# Patient Record
Sex: FEMALE | Race: WHITE | HISPANIC OR LATINO | URBAN - METROPOLITAN AREA
[De-identification: names, ages, dates, MRNs, and addresses within clinical notes are randomized per-mention and may not be internally consistent; named-entity substitution may affect disease eponyms.]

---

## 2017-06-02 ENCOUNTER — EMERGENCY (EMERGENCY)
Facility: HOSPITAL | Age: 27
LOS: 1 days | Discharge: HOME | End: 2017-06-02
Attending: EMERGENCY MEDICINE
Payer: COMMERCIAL

## 2017-06-02 VITALS
HEIGHT: 63 IN | HEART RATE: 91 BPM | TEMPERATURE: 98 F | OXYGEN SATURATION: 97 % | SYSTOLIC BLOOD PRESSURE: 149 MMHG | WEIGHT: 160.06 LBS | RESPIRATION RATE: 18 BRPM | DIASTOLIC BLOOD PRESSURE: 99 MMHG

## 2017-06-02 DIAGNOSIS — S61.210A LACERATION WITHOUT FOREIGN BODY OF RIGHT INDEX FINGER WITHOUT DAMAGE TO NAIL, INITIAL ENCOUNTER: ICD-10-CM

## 2017-06-02 DIAGNOSIS — Z88.6 ALLERGY STATUS TO ANALGESIC AGENT: ICD-10-CM

## 2017-06-02 DIAGNOSIS — K21.9 GASTRO-ESOPHAGEAL REFLUX DISEASE WITHOUT ESOPHAGITIS: ICD-10-CM
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
## 2017-06-02 PROCEDURE — 99283 EMERGENCY DEPT VISIT LOW MDM: CPT | Mod: 25

## 2017-06-02 PROCEDURE — 73140 X-RAY EXAM OF FINGER(S): CPT | Mod: 26,RT

## 2017-06-02 PROCEDURE — 12001 RPR S/N/AX/GEN/TRNK 2.5CM/<: CPT

## 2017-06-02 PROCEDURE — 73140 X-RAY EXAM OF FINGER(S): CPT

## 2017-06-02 NOTE — ED STATDOCS - PROGRESS NOTE DETAILS
NP NOTE: Pt seen by intake physician and HPI/ROS/PE/MDM reviewed. Pt seen and evaluated. Discussed plan and any resulted studies at this time. Will continue to monitor and re-evaluate.  Re-Evaluation: wound sutured, instructions given, fu with hand

## 2017-06-02 NOTE — ED STATDOCS - OBJECTIVE STATEMENT
25 y/o F pt w/ no significant PMHx presents to the ED c/o R 2nd finger lac onset today. Pt states that she was using a tomato slicer at work when she sustained the lac. Pt denies numbness/tingling, focal weakness, other injuries, or any other complaints. NKDA. Pt's tetanus is UTD.

## 2017-06-02 NOTE — ED STATDOCS - ATTENDING CONTRIBUTION TO CARE
I, Travis Saenz, performed the initial face to face bedside interview with this patient regarding history of present illness, review of symptoms and relevant past medical, social and family history.  I completed an independent physical examination.  I was the initial provider who evaluated this patient. I have signed out the follow up of any pending tests (i.e. labs, radiological studies) to the ACP.  I have communicated the patient’s plan of care and disposition with the ACP.  The history, relevant review of systems, past medical and surgical history, medical decision making, and physical examination was documented by the scribe in my presence and I attest to the accuracy of the documentation.

## 2017-06-02 NOTE — ED PROCEDURE NOTE - CPROC ED POST PROC CARE GUIDE1
Elevate the injured extremity as instructed./Instructed patient/caregiver regarding signs and symptoms of infection./Verbal/written post procedure instructions were given to patient/caregiver./Keep the cast/splint/dressing clean and dry./Instructed patient/caregiver to follow-up with primary care physician.
Verbal/written post procedure instructions were given to patient/caregiver./Keep the cast/splint/dressing clean and dry./Instructed patient/caregiver to follow-up with primary care physician./Instructed patient/caregiver regarding signs and symptoms of infection.

## 2017-06-02 NOTE — ED STATDOCS - NS ED MD SCRIBE ATTENDING SCRIBE SECTIONS
VITAL SIGNS( Pullset)/PHYSICAL EXAM/DISPOSITION/REVIEW OF SYSTEMS/HISTORY OF PRESENT ILLNESS/PAST MEDICAL/SURGICAL/SOCIAL HISTORY/HIV

## 2017-10-04 ENCOUNTER — TRANSCRIPTION ENCOUNTER (OUTPATIENT)
Age: 27
End: 2017-10-04

## 2018-06-26 ENCOUNTER — TRANSCRIPTION ENCOUNTER (OUTPATIENT)
Age: 28
End: 2018-06-26

## 2019-10-08 ENCOUNTER — EMERGENCY (EMERGENCY)
Facility: HOSPITAL | Age: 29
LOS: 1 days | Discharge: HOME | End: 2019-10-08
Attending: EMERGENCY MEDICINE
Payer: COMMERCIAL

## 2019-10-08 VITALS
TEMPERATURE: 99 F | HEIGHT: 63 IN | SYSTOLIC BLOOD PRESSURE: 124 MMHG | DIASTOLIC BLOOD PRESSURE: 76 MMHG | WEIGHT: 156.97 LBS | OXYGEN SATURATION: 97 % | RESPIRATION RATE: 20 BRPM | HEART RATE: 77 BPM

## 2019-10-08 PROCEDURE — 99283 EMERGENCY DEPT VISIT LOW MDM: CPT

## 2019-10-08 RX ORDER — IBUPROFEN 200 MG
1 TABLET ORAL
Qty: 20 | Refills: 0
Start: 2019-10-08 | End: 2019-10-12

## 2019-10-08 RX ORDER — PSEUDOEPHEDRINE HCL 30 MG
1 TABLET ORAL
Qty: 8 | Refills: 0
Start: 2019-10-08 | End: 2019-10-11

## 2019-10-08 RX ORDER — PSEUDOEPHEDRINE HCL 30 MG
30 TABLET ORAL ONCE
Refills: 0 | Status: COMPLETED | OUTPATIENT
Start: 2019-10-08 | End: 2019-10-08

## 2019-10-08 RX ORDER — IBUPROFEN 200 MG
600 TABLET ORAL ONCE
Refills: 0 | Status: COMPLETED | OUTPATIENT
Start: 2019-10-08 | End: 2019-10-08

## 2019-10-08 RX ADMIN — Medication 600 MILLIGRAM(S): at 05:00

## 2019-10-08 RX ADMIN — Medication 30 MILLIGRAM(S): at 04:59

## 2019-10-08 NOTE — ED ADULT NURSE NOTE - OBJECTIVE STATEMENT
pt sitting up in the chair. pt is alert and oriented x3.  pt c/o of headache, and flu like symptoms.

## 2019-10-08 NOTE — ED ADULT TRIAGE NOTE - CHIEF COMPLAINT QUOTE
pt c/o cough, congestion, cp from coughing, started saturday,  pt taking over the counter cold medication

## 2019-10-08 NOTE — ED PROVIDER NOTE - CLINICAL SUMMARY MEDICAL DECISION MAKING FREE TEXT BOX
PT with stable VS, no acute distress, non toxic appearing, tolerating PO in the ED, Pt with URI like symptoms A-febrile well appearing will tx with supportive care dc home with follow up to PCP, educated about when to return to the ED if needed. PT verbalizes that he understands all instructions and results.

## 2019-10-08 NOTE — ED PROVIDER NOTE - OBJECTIVE STATEMENT
PT with no SPMHx presents to the ED with complaint of cough and congestion x3days. PT states that she had a sudden onset of symptoms that including stuffy nose chest congestion, non productive cough and rhinorrhea. PT states that she took Mucinex and steroidal nasal spray with little to no improvement. PT sates that she is having mild sharp non radiating pain in the middle of her chest when she coughs. PT denies hormone use, smoking, long plan/car rides, Hx of clotting disorders, calf pain, [place], heat palpitations, anxiety, hemoptysis.

## 2019-10-08 NOTE — ED PROVIDER NOTE - ENMT, MLM
Airway patent, Nasal mucosa clear rhinorrhea. Mouth with normal mucosa. Throat has no vesicles, no oropharyngeal exudates and uvula is midline.

## 2019-10-08 NOTE — ED PROVIDER NOTE - NS ED ROS FT
ROS: CONTUSIONAL: Denies fever, fatigue, wt loss. HEAD: Denies trauma, HA, Dizziness. EYE: Denies Acute visual changes, diplopia. ENMT: Denies change in hearing, tinnitus, epistaxis, difficulty swallowing, sore throat. CARDIO: Denies CP, palpitations, edema. RESP: Denies SOB , Diff breathing, hemoptysis. GI: Denies N/V, ABD pain, change in bowel movement. URINARY: Denies difficulty urinating, pelvic pain. MS:  Denies joint pain, back pain, weakness, decreased ROM, swelling. NEURO: Denies change in gait, seizures, loss of sensation, dizziness, confusion LOC.  PSY: NO SI/HI.

## 2019-10-08 NOTE — ED PROVIDER NOTE - RADIOGRAPHS
Pt educated about the risks vs benefits of imaging at this time and agrees that not warranted for their symptoms, and PE./not clinically warranted

## 2019-10-08 NOTE — ED PROVIDER NOTE - PATIENT PORTAL LINK FT
You can access the FollowMyHealth Patient Portal offered by [facility] by registering at the following website: [email or web address]. By joining [employer]’s FollowMyHealth portal, you will also be able to view your health information using other applications (apps) compatible with our system.

## 2019-12-20 ENCOUNTER — EMERGENCY (EMERGENCY)
Facility: HOSPITAL | Age: 29
LOS: 1 days | Discharge: HOME | End: 2019-12-20
Attending: EMERGENCY MEDICINE
Payer: COMMERCIAL

## 2019-12-20 VITALS
HEIGHT: 63 IN | OXYGEN SATURATION: 98 % | RESPIRATION RATE: 22 BRPM | SYSTOLIC BLOOD PRESSURE: 109 MMHG | DIASTOLIC BLOOD PRESSURE: 72 MMHG | WEIGHT: 160.06 LBS | HEART RATE: 82 BPM | TEMPERATURE: 98 F

## 2019-12-20 PROCEDURE — 99282 EMERGENCY DEPT VISIT SF MDM: CPT | Mod: 25

## 2019-12-20 PROCEDURE — 99283 EMERGENCY DEPT VISIT LOW MDM: CPT | Mod: 25

## 2019-12-20 PROCEDURE — 12001 RPR S/N/AX/GEN/TRNK 2.5CM/<: CPT | Mod: F4

## 2019-12-20 PROCEDURE — 12001 RPR S/N/AX/GEN/TRNK 2.5CM/<: CPT

## 2019-12-20 NOTE — ED PROVIDER NOTE - PHYSICAL EXAMINATION
Constitutional: Awake, alert, in no acute distress  Extremities: ~0.6 cm partial avulsion laceration over dorsal aspect of L 5th PIP joint.  Full ROM of L 5th finger, no neurovascular deficit.  Skin: no rash  Neuro: AAOx3

## 2019-12-20 NOTE — ED PROVIDER NOTE - NSFOLLOWUPINSTRUCTIONS_ED_ALL_ED_FT
STITCHES OUT IN 7-10 DAYS.  KEEP FINGER SPLINT ON.  ANTIBIOTIC OINTMENT.  RETURN TO THE EMERGENCY ROOM FOR ANY EVIDENCE OF INFECTION (REDNESS, SWELLING, PUS, FEVER, CHILLS).    Sutured Wound Care  Sutures are stitches that can be used to close wounds. Taking care of your wound properly can help to prevent pain and infection. It can also help your wound to heal more quickly. Follow instructions from your health care provider about how to care for your sutured wound.  Supplies needed:  Soap and water.A clean bandage (dressing), if needed.Antibiotic ointment.A clean towel.How to care for your sutured wound     Keep the wound completely dry for the first 24 hours, or for as long as directed by your health care provider. After 24–48 hours, you may shower or bathe as directed by your health care provider. Do not soak or submerge the wound in water until the sutures have been removed.After the first 24 hours, clean the wound once a day, or as often as directed by your health care provider, using the following steps:  Wash the wound with soap and water.Rinse the wound with water to remove all soap.Pat the wound dry with a clean towel. Do not rub the wound.After cleaning the wound, apply a thin layer of antibiotic ointment as directed by your health care provider. This will prevent infection and keep the dressing from sticking to the wound.Follow instructions from your health care provider about how to change your dressing:  Wash your hands with soap and water. If soap and water are not available, use hand .Change your dressing at least once a day, or as often as told by your health care provider. If your dressing gets wet or dirty, change it.Leave sutures and other skin closures, such as adhesive tape or skin glue, in place. These skin closures may need to stay in place for 2 weeks or longer. If adhesive strip edges start to loosen and curl up, you may trim the loose edges. Do not remove adhesive strips completely unless your health care provider tells you to do that.Check your wound every day for signs of infection. Watch for:  Redness, swelling, or pain.Fluid or blood.Warmth.Pus or a bad smell.Have the sutures removed as directed by your health care provider.Follow these instructions at home:  Medicines     Take or apply over-the-counter and prescription medicines only as told by your health care provider.If you were prescribed an antibiotic medicine or ointment, take or apply it as told by your health care provider. Do not stop using the antibiotic even if your condition improves.General instructions     To help reduce scarring after your wound heals, cover your wound with clothing or apply sunscreen of at least 30 SPF whenever you are outside.Do not scratch or pick at your wound.Avoid stretching your wound.Raise (elevate) the injured area above the level of your heart while you are sitting or lying down, if possible.Drink enough fluids to keep your urine clear or pale yellow.Keep all follow-up visits as told by your health care provider. This is important.Contact a health care provider if:  You received a tetanus shot and you have swelling, severe pain, redness, or bleeding at the injection site.Your wound breaks open.You have redness, swelling, or pain around your wound.You have fluid or blood coming from your wound.Your wound feels warm to the touch.You have a fever.You notice something coming out of your wound, such as wood or glass.You have pain that does not get better with medicine.The skin near your wound changes color.You need to change your dressing very frequently due to a lot of fluid, blood, or pus draining from the wound.You develop a new rash.You develop numbness around the wound.Get help right away if:  You develop severe swelling around your wound.You have pus or a bad smell coming from your wound.Your pain suddenly gets worse and is severe.You develop painful lumps near your wound or anywhere on your body.You have a red streak going away from your wound.The wound is on your hand or foot and:  You cannot properly move a finger or toe.Your fingers or toes look pale or bluish.You have numbness that is spreading down your hand, foot, fingers, or toes.Summary  Sutures are stitches that can be used to close wounds.Taking care of your wound properly can help to prevent pain and infection.Keep the wound completely dry for the first 24 hours, or for as long as directed by your health care provider. After 24–48 hours, you may shower or bathe as directed by your health care provider.This information is not intended to replace advice given to you by your health care provider. Make sure you discuss any questions you have with your health care provider.

## 2019-12-20 NOTE — ED PROVIDER NOTE - PATIENT PORTAL LINK FT
You can access the FollowMyHealth Patient Portal offered by [facility] by registering at the following website: [email or web address]. By joining [employer]’s FollowMyHealth portal, you will also be able to view your health information using other applications (apps) compatible with our system.

## 2019-12-20 NOTE — ED PROCEDURE NOTE - CPROC ED INFORMED CONSENT1
Benefits, risks, and possible complications of procedure explained to patient/caregiver who verbalized understanding and gave verbal consent.
Breath sounds clear and equal bilaterally.

## 2019-12-20 NOTE — ED PROVIDER NOTE - ATTENDING CONTRIBUTION TO CARE
left 5th digit v-shaped lac sp cleaning meat tdap uptodate neurovascularly intact cap refill <2 sec no other complaints no ttp to digit --will lac repair dc no other complaints Denies f/c/n/v/cp/sob/palpitations/ cough/rash/headache/dizziness/abd.pain/d/c/dysuria/hematuria

## 2019-12-20 NOTE — ED PROCEDURE NOTE - CPROC ED POST PROC CARE GUIDE1
Verbal/written post procedure instructions were given to patient/caregiver./Keep the cast/splint/dressing clean and dry./Instructed patient/caregiver regarding signs and symptoms of infection./preformed finger splint applied

## 2019-12-30 ENCOUNTER — EMERGENCY (EMERGENCY)
Facility: HOSPITAL | Age: 29
LOS: 1 days | Discharge: HOME | End: 2019-12-30
Attending: EMERGENCY MEDICINE
Payer: COMMERCIAL

## 2019-12-30 VITALS
RESPIRATION RATE: 18 BRPM | WEIGHT: 164.02 LBS | OXYGEN SATURATION: 99 % | HEIGHT: 63 IN | SYSTOLIC BLOOD PRESSURE: 106 MMHG | HEART RATE: 86 BPM | TEMPERATURE: 98 F | DIASTOLIC BLOOD PRESSURE: 67 MMHG

## 2019-12-30 PROCEDURE — G0463: CPT

## 2019-12-30 NOTE — ED PROVIDER NOTE - MUSCULOSKELETAL, MLM
Spine appears normal , left 5th phalanx with 1 cm lac on the dorsum 2 suture in place , no erythema or ,swollen , no bony TTP , able flex and extended

## 2019-12-30 NOTE — ED PROVIDER NOTE - OBJECTIVE STATEMENT
: 29y F, no significant PMH, presenting for L 5th finger laceration about a week ago for suture removal .s.p Pt was cleaning a piece of meat with a knife and cut her hand.  No numbness or tingling.  redness,  swollen , or difficult moving the finger

## 2019-12-30 NOTE — ED PROVIDER NOTE - NSFOLLOWUPINSTRUCTIONS_ED_ALL_ED_FT
keep the steri strip    follow up with pcp   come back if any redness , swollen, unable to bend , or any other signs of infection

## 2019-12-30 NOTE — ED PROVIDER NOTE - PATIENT PORTAL LINK FT
You can access the FollowMyHealth Patient Portal offered by [facility] by registering at the following website: [email or web address]. By joining [employer]’s FollowMyHealth portal, you will also be able to view your health information using other applications (apps) compatible with our system.

## 2020-01-29 ENCOUNTER — APPOINTMENT (OUTPATIENT)
Dept: ORTHOPEDIC SURGERY | Facility: CLINIC | Age: 30
End: 2020-01-29
Payer: COMMERCIAL

## 2020-01-29 VITALS — HEART RATE: 78 BPM | SYSTOLIC BLOOD PRESSURE: 108 MMHG | DIASTOLIC BLOOD PRESSURE: 71 MMHG | TEMPERATURE: 78 F

## 2020-01-29 VITALS — HEIGHT: 62 IN | BODY MASS INDEX: 30.36 KG/M2 | WEIGHT: 165 LBS

## 2020-01-29 DIAGNOSIS — Z78.9 OTHER SPECIFIED HEALTH STATUS: ICD-10-CM

## 2020-01-29 DIAGNOSIS — R20.2 PARESTHESIA OF SKIN: ICD-10-CM

## 2020-01-29 DIAGNOSIS — Z72.89 OTHER PROBLEMS RELATED TO LIFESTYLE: ICD-10-CM

## 2020-01-29 DIAGNOSIS — S66.911D STRAIN OF UNSPECIFIED MUSCLE, FASCIA AND TENDON AT WRIST AND HAND LEVEL, RIGHT HAND, SUBSEQUENT ENCOUNTER: ICD-10-CM

## 2020-01-29 PROBLEM — Z00.00 ENCOUNTER FOR PREVENTIVE HEALTH EXAMINATION: Status: ACTIVE | Noted: 2020-01-29

## 2020-01-29 PROCEDURE — 99204 OFFICE O/P NEW MOD 45 MIN: CPT

## 2020-01-29 PROCEDURE — 73110 X-RAY EXAM OF WRIST: CPT | Mod: RT

## 2020-01-29 RX ORDER — MELOXICAM 7.5 MG/1
7.5 TABLET ORAL TWICE DAILY
Qty: 28 | Refills: 1 | Status: ACTIVE | COMMUNITY
Start: 2020-01-29 | End: 1900-01-01

## 2020-01-29 NOTE — HISTORY OF PRESENT ILLNESS
[FreeTextEntry1] : the patient is a 29-year-old female who presents for evaluation of right wrist pain and occasional paresthesias.She works as a  and using a knife exacerbates her symptoms. She complains of a dull pain at the dorsal aspect of the wrist that radiates to the digits. She also complains of a pins and needles type feeling in the thumb and index finger.

## 2020-01-29 NOTE — PHYSICAL EXAM
[de-identified] : Physical exam shows the patient to be alert and oriented x3, capable of ambulation. The patient is well-developed and well-nourished in no apparent respiratory distress. Majority of the skin is intact bilaterally in the upper extremities without lymphadenopathy at the elbows.\par \par There is a negative Spurling test. There is no sensitivity or Tinel's over the axilla bilaterally in the area of the brachial plexus.\par \par The elbows have a symmetric and full range of motion with no pain upon forced flexion or extension bilaterally. There is no tenderness over the medial or lateral epicondyles bilaterally. The biceps and triceps are intact bilaterally with 5 over 5 strength. There is no joint effusion or instability of the medial and lateral collateral ligament complex bilaterally. There is no sensitivity of the median ulnar or radial nerves with provocative tests bilaterally.\par \par The wrists have a symmetric range of motion bilaterally. There is no tenderness over the scaphoid, scapholunate or lunotriquetral ligaments bilaterally. There is a negative Larios test bilaterally. There is negative tenderness over the radial ulnar joint or TFCC and no evidence of instability bilaterally. No tenderness over the pisotriquetral or hamate hook or CMC joint bilaterally. There is 5 over 5 strength of the wrists bilaterally.\par \par There is a negative Finkelstein test bilaterally and no tenderness over the A1 pulleys. There is no tenderness or instability of the MP PIP or DIP joints in the digits bilaterally with no evidence of digital malrotation.\par \par There is no sensitivity or masses around the ulnar nerve at the level of the wrist bilaterally.\par \par \par There is 2+ pulses over the radial arteries bilaterally with good capillary refill.\par \par There is a positive Tinel's and a positive Phalen's test at 15 seconds on the right. There is no thenar atrophy, good opposition is present. The remaining intrinsic musculature has good strength bilaterally.\par \par Sensation is intact in the median nerve distribution, 2 point discrimination 5 millimeters.\par  [de-identified] :  x-ray views of the right wrist are reviewed there is no osseous abnormality

## 2020-01-29 NOTE — ASSESSMENT
[FreeTextEntry1] : the patient is a 29-year-old female with right wrist pain and paresthesias in the digits. I recommended a course of anti-inflammatory medication as well as an EMG for further workup.She will followup to review the results of the EMG and to discuss further treatment options.

## 2020-03-20 ENCOUNTER — APPOINTMENT (OUTPATIENT)
Dept: NEUROLOGY | Facility: CLINIC | Age: 30
End: 2020-03-20

## 2020-04-26 ENCOUNTER — MESSAGE (OUTPATIENT)
Age: 30
End: 2020-04-26

## 2021-09-08 ENCOUNTER — EMERGENCY (EMERGENCY)
Facility: HOSPITAL | Age: 31
LOS: 1 days | Discharge: HOME | End: 2021-09-08
Attending: EMERGENCY MEDICINE
Payer: COMMERCIAL

## 2021-09-08 VITALS
DIASTOLIC BLOOD PRESSURE: 74 MMHG | OXYGEN SATURATION: 99 % | SYSTOLIC BLOOD PRESSURE: 111 MMHG | TEMPERATURE: 98 F | HEART RATE: 68 BPM | RESPIRATION RATE: 20 BRPM | WEIGHT: 169.98 LBS | HEIGHT: 63 IN

## 2021-09-08 LAB
ALBUMIN SERPL ELPH-MCNC: 4 G/DL — SIGNIFICANT CHANGE UP (ref 3.3–5.2)
ALP SERPL-CCNC: 97 U/L — SIGNIFICANT CHANGE UP (ref 40–120)
ALT FLD-CCNC: 25 U/L — SIGNIFICANT CHANGE UP
ANION GAP SERPL CALC-SCNC: 15 MMOL/L — SIGNIFICANT CHANGE UP (ref 5–17)
AST SERPL-CCNC: 31 U/L — SIGNIFICANT CHANGE UP
BILIRUB SERPL-MCNC: <0.2 MG/DL — LOW (ref 0.4–2)
BUN SERPL-MCNC: 10.2 MG/DL — SIGNIFICANT CHANGE UP (ref 8–20)
CALCIUM SERPL-MCNC: 9.8 MG/DL — SIGNIFICANT CHANGE UP (ref 8.6–10.2)
CHLORIDE SERPL-SCNC: 102 MMOL/L — SIGNIFICANT CHANGE UP (ref 98–107)
CO2 SERPL-SCNC: 17 MMOL/L — LOW (ref 22–29)
CREAT SERPL-MCNC: 0.55 MG/DL — SIGNIFICANT CHANGE UP (ref 0.5–1.3)
GLUCOSE SERPL-MCNC: 78 MG/DL — SIGNIFICANT CHANGE UP (ref 70–99)
HCG SERPL-ACNC: <4 MIU/ML — SIGNIFICANT CHANGE UP
POTASSIUM SERPL-MCNC: 4.9 MMOL/L — SIGNIFICANT CHANGE UP (ref 3.5–5.3)
POTASSIUM SERPL-SCNC: 4.9 MMOL/L — SIGNIFICANT CHANGE UP (ref 3.5–5.3)
PROT SERPL-MCNC: 8.1 G/DL — SIGNIFICANT CHANGE UP (ref 6.6–8.7)
SODIUM SERPL-SCNC: 134 MMOL/L — LOW (ref 135–145)

## 2021-09-08 PROCEDURE — 36415 COLL VENOUS BLD VENIPUNCTURE: CPT

## 2021-09-08 PROCEDURE — G1004: CPT

## 2021-09-08 PROCEDURE — 70450 CT HEAD/BRAIN W/O DYE: CPT | Mod: MF

## 2021-09-08 PROCEDURE — 84702 CHORIONIC GONADOTROPIN TEST: CPT

## 2021-09-08 PROCEDURE — 80053 COMPREHEN METABOLIC PANEL: CPT

## 2021-09-08 PROCEDURE — 70450 CT HEAD/BRAIN W/O DYE: CPT | Mod: 26,MF

## 2021-09-08 PROCEDURE — 99284 EMERGENCY DEPT VISIT MOD MDM: CPT | Mod: 25

## 2021-09-08 PROCEDURE — 96375 TX/PRO/DX INJ NEW DRUG ADDON: CPT

## 2021-09-08 PROCEDURE — 99284 EMERGENCY DEPT VISIT MOD MDM: CPT

## 2021-09-08 PROCEDURE — 96374 THER/PROPH/DIAG INJ IV PUSH: CPT

## 2021-09-08 RX ORDER — ACETAMINOPHEN 500 MG
1000 TABLET ORAL ONCE
Refills: 0 | Status: COMPLETED | OUTPATIENT
Start: 2021-09-08 | End: 2021-09-08

## 2021-09-08 RX ORDER — SODIUM CHLORIDE 9 MG/ML
1000 INJECTION INTRAMUSCULAR; INTRAVENOUS; SUBCUTANEOUS ONCE
Refills: 0 | Status: COMPLETED | OUTPATIENT
Start: 2021-09-08 | End: 2021-09-08

## 2021-09-08 RX ORDER — METOCLOPRAMIDE HCL 10 MG
10 TABLET ORAL ONCE
Refills: 0 | Status: COMPLETED | OUTPATIENT
Start: 2021-09-08 | End: 2021-09-08

## 2021-09-08 RX ADMIN — Medication 125 MILLIGRAM(S): at 15:46

## 2021-09-08 RX ADMIN — Medication 400 MILLIGRAM(S): at 15:35

## 2021-09-08 RX ADMIN — Medication 10 MILLIGRAM(S): at 15:46

## 2021-09-08 RX ADMIN — SODIUM CHLORIDE 1000 MILLILITER(S): 9 INJECTION INTRAMUSCULAR; INTRAVENOUS; SUBCUTANEOUS at 15:30

## 2021-09-08 NOTE — ED PROVIDER NOTE - PROGRESS NOTE DETAILS
PT evaluated by intake physician. HPI/PE/ROS as noted above. Will follow up plan per intake physician. pt feeling better at this time. Will dc with f/u and return precautions. PT evaluated by intake physician. HPI/PE/ROS as noted above. Will follow up plan per intake physician. pt feeling better at this time and requesting to leave. Will dc with f/u and return precautions.

## 2021-09-08 NOTE — ED PROVIDER NOTE - NSFOLLOWUPINSTRUCTIONS_ED_ALL_ED_FT
Follow up with neurology.  Come back with new or worsening symptoms.    Headache    A headache is pain or discomfort felt around the head or neck area. The specific cause of a headache may not be found as there are many types including tension headaches, migraine headaches, and cluster headaches. Watch your condition for any changes. Things you can do to manage your pain include taking over the counter and prescription medications as instructed by your health care provider, lying down in a dark quiet room, limiting stress, getting regular sleep, and refraining from alcohol and tobacco products.    SEEK IMMEDIATE MEDICAL CARE IF YOU HAVE ANY OF THE FOLLOWING SYMPTOMS: fever, vomiting, stiff neck, loss of vision, problems with speech, muscle weakness, loss of balance, trouble walking, passing out, or confusion.

## 2021-09-08 NOTE — ED PROVIDER NOTE - PATIENT PORTAL LINK FT
You can access the FollowMyHealth Patient Portal offered by [facility] by registering at the following website: [email or web address]. By joining [employer]’s FollowMyHealth portal, you will also be able to view your health information using other applications (apps) compatible with our system.

## 2021-09-08 NOTE — ED PROVIDER NOTE - CARE PROVIDER_API CALL
Nolan Brownlee; PhD)  Neurology; Vascular Neurology  [address] [place] [address]  Phone: [phone]  Fax: [phone]  Follow Up Time:

## 2021-09-08 NOTE — ED ADULT TRIAGE NOTE - BSA (M2)
1.8 Notified Dr Fern Jimenez of patient's most recent blood sugar of 170. Per Dr. Brett Negron he does not want patient's blood sugar to get below 100 prior to procedure. Received orders from Dr. Fern Jimenez to hold the insulin. 0900-Notified Dr. Brett Negron that orders were received from Dr. Fern Jimenez to hold insulin. Per Dr. Brett Negron, no blood sugar recheck needed prior to surgery.

## 2021-12-10 ENCOUNTER — EMERGENCY (EMERGENCY)
Facility: HOSPITAL | Age: 31
LOS: 1 days | Discharge: LEFT BEFORE DISCHARGE | End: 2021-12-10
Payer: SELF-PAY

## 2021-12-10 PROCEDURE — L9992: CPT

## 2022-08-02 NOTE — ED PROVIDER NOTE - OBJECTIVE STATEMENT
29y F, no significant PMH, presenting for L 5th finger laceration.  Pt was cleaning a piece of meat with a knife and cut her hand.  No numbness or tingling.  Tetanus up to date. 71.2

## 2022-11-29 ENCOUNTER — TRANSCRIPTION ENCOUNTER (OUTPATIENT)
Age: 32
End: 2022-11-29

## 2022-11-29 ENCOUNTER — EMERGENCY (EMERGENCY)
Facility: HOSPITAL | Age: 32
LOS: 1 days | Discharge: HOME | End: 2022-11-29
Attending: STUDENT IN AN ORGANIZED HEALTH CARE EDUCATION/TRAINING PROGRAM
Payer: MEDICAID

## 2022-11-29 VITALS
TEMPERATURE: 99 F | SYSTOLIC BLOOD PRESSURE: 109 MMHG | HEART RATE: 113 BPM | OXYGEN SATURATION: 96 % | RESPIRATION RATE: 18 BRPM | DIASTOLIC BLOOD PRESSURE: 70 MMHG

## 2022-11-29 LAB
APPEARANCE UR: CLEAR — SIGNIFICANT CHANGE UP
BILIRUB UR-MCNC: NEGATIVE — SIGNIFICANT CHANGE UP
COLOR SPEC: YELLOW — SIGNIFICANT CHANGE UP
DIFF PNL FLD: NEGATIVE — SIGNIFICANT CHANGE UP
EPI CELLS # UR: SIGNIFICANT CHANGE UP
GLUCOSE UR QL: NEGATIVE MG/DL — SIGNIFICANT CHANGE UP
HCG UR QL: NEGATIVE — SIGNIFICANT CHANGE UP
KETONES UR-MCNC: ABNORMAL
LEUKOCYTE ESTERASE UR-ACNC: ABNORMAL
NITRITE UR-MCNC: NEGATIVE — SIGNIFICANT CHANGE UP
PH UR: 8 — SIGNIFICANT CHANGE UP (ref 5–8)
PROT UR-MCNC: NEGATIVE — SIGNIFICANT CHANGE UP
RBC CASTS # UR COMP ASSIST: NEGATIVE /HPF — SIGNIFICANT CHANGE UP (ref 0–4)
SP GR SPEC: 1.01 — SIGNIFICANT CHANGE UP (ref 1.01–1.02)
UROBILINOGEN FLD QL: NEGATIVE MG/DL — SIGNIFICANT CHANGE UP
WBC UR QL: SIGNIFICANT CHANGE UP /HPF (ref 0–5)

## 2022-11-29 PROCEDURE — 99283 EMERGENCY DEPT VISIT LOW MDM: CPT

## 2022-11-29 PROCEDURE — 99284 EMERGENCY DEPT VISIT MOD MDM: CPT

## 2022-11-29 PROCEDURE — 81025 URINE PREGNANCY TEST: CPT

## 2022-11-29 PROCEDURE — 81001 URINALYSIS AUTO W/SCOPE: CPT

## 2022-11-29 RX ORDER — IBUPROFEN 200 MG
1 TABLET ORAL
Qty: 20 | Refills: 0
Start: 2022-11-29 | End: 2022-12-03

## 2022-11-29 RX ORDER — ONDANSETRON 8 MG/1
1 TABLET, FILM COATED ORAL
Qty: 16 | Refills: 0
Start: 2022-11-29 | End: 2022-12-02

## 2022-11-29 RX ORDER — SODIUM CHLORIDE 9 MG/ML
1000 INJECTION, SOLUTION INTRAVENOUS ONCE
Refills: 0 | Status: COMPLETED | OUTPATIENT
Start: 2022-11-29 | End: 2022-11-29

## 2022-11-29 RX ORDER — ONDANSETRON 8 MG/1
4 TABLET, FILM COATED ORAL ONCE
Refills: 0 | Status: COMPLETED | OUTPATIENT
Start: 2022-11-29 | End: 2022-11-29

## 2022-11-29 RX ORDER — ACETAMINOPHEN 500 MG
650 TABLET ORAL ONCE
Refills: 0 | Status: COMPLETED | OUTPATIENT
Start: 2022-11-29 | End: 2022-11-29

## 2022-11-29 RX ADMIN — Medication 650 MILLIGRAM(S): at 03:02

## 2022-11-29 RX ADMIN — ONDANSETRON 4 MILLIGRAM(S): 8 TABLET, FILM COATED ORAL at 03:22

## 2022-11-29 NOTE — ED PROVIDER NOTE - CLINICAL SUMMARY MEDICAL DECISION MAKING FREE TEXT BOX
PT with stable VS, no acute distress, non toxic appearing, tolerating PO in the ED, Pt with non tender abd sig clinical improvement after medical management, Pt to be dc home with supportive care, follow up to PCP, educated about when to return to the ED if needed. PT verbalizes that he understands all instructions and results. Pt informed that ED is open and available 24/7 365 days a yr, encouraged to return to the ED if they have any change in condition, or feel the need for revaluation.

## 2022-11-29 NOTE — ED ADULT TRIAGE NOTE - CHIEF COMPLAINT QUOTE
Pt presents to ED c/o flu-like symptoms that began today. Pt endorses N/V, body aches, chills and HA. States that her son tested flu+ this AM. Reports that she is vaccinated for COVID and unvaccinated for FLU.

## 2022-11-29 NOTE — ED PROVIDER NOTE - NS ED ROS FT
ROS: CONTUSIONAL:  fever, chills,  Denies fatigue, wt loss. HEAD: Denies trauma, HA, Dizziness. EYE: Denies Acute visual changes, diplopia. ENMT: Denies change in hearing, tinnitus, epistaxis, difficulty swallowing, sore throat. CARDIO: Denies CP, palpitations, edema. RESP: Denies Cough, SOB , Diff breathing, hemoptysis. GI:  N/V Denies, ABD pain, change in bowel movement. URINARY: Denies difficulty urinating, pelvic pain. MS:  Denies joint pain, back pain, weakness, decreased ROM, swelling. NEURO: Denies change in gait, seizures, loss of sensation, dizziness, confusion LOC.  PSY: NO SI/HI. SKIN: Denies Rash, bruising.

## 2022-11-29 NOTE — ED PROVIDER NOTE - NEUROLOGICAL, MLM
This medical record reflects one or more clinical indicators suggestive of morbid obesity  BMI Findings:  BMI Classifications: Morbid Obesity 40-44 9     Body mass index is 44 83 kg/m²  See Nutrition note dated 7/7/18 for additional details  Completed nutrition assessment is viewable in the nutrition documentation 
Alert and oriented, no focal deficits, no motor or sensory deficits.

## 2022-11-29 NOTE — ED PROVIDER NOTE - NSFOLLOWUPINSTRUCTIONS_ED_ALL_ED_FT
Nausea and Vomiting, Adult      Nausea is the feeling that you have an upset stomach or that you are about to vomit. As nausea gets worse, it can lead to vomiting. Vomiting is when stomach contents forcefully come out of your mouth as a result of nausea. Vomiting can make you feel weak and cause you to become dehydrated.    Dehydration can make you feel tired and thirsty, cause you to have a dry mouth, and decrease how often you urinate. Older adults and people with other diseases or a weak disease-fighting system (immune system) are at higher risk for dehydration. It is important to treat your nausea and vomiting as told by your health care provider.      Follow these instructions at home:    Watch your symptoms for any changes. Tell your health care provider about them.      Eating and drinking       A bottle of clear fruit juice and glass of water.       A sign showing that a person should not drink alcohol.     •Take an oral rehydration solution (ORS). This is a drink that is sold at pharmacies and retail stores.      •Drink clear fluids slowly and in small amounts as you are able. Clear fluids include water, ice chips, low-calorie sports drinks, and fruit juice that has water added (diluted fruit juice).      •Eat bland, easy-to-digest foods in small amounts as you are able. These foods include bananas, applesauce, rice, lean meats, toast, and crackers.      •Avoid fluids that contain a lot of sugar or caffeine, such as energy drinks, sports drinks, and soda.      •Avoid alcohol.      •Avoid spicy or fatty foods.      General instructions     •Take over-the-counter and prescription medicines only as told by your health care provider.      •Drink enough fluid to keep your urine pale yellow.      •Wash your hands often using soap and water for at least 20 seconds. If soap and water are not available, use hand .      •Make sure that everyone in your household washes their hands well and often.      •Rest at home while you recover.      •Watch your condition for any changes.      •Take slow and deep breaths when you feel nauseous.      •Keep all follow-up visits. This is important.        Contact a health care provider if:    •Your symptoms get worse.      •You have new symptoms.      •You have a fever.      •You cannot drink fluids without vomiting.      •Your nausea does not go away after 2 days.      •You feel light-headed or dizzy.      •You have a headache.      •You have muscle cramps.      •You have a rash.      •You have pain while urinating.        Get help right away if:    •You have pain in your chest, neck, arm, or jaw.      •You feel extremely weak or you faint.      •You have persistent vomiting.      •You have vomit that is bright red or looks like black coffee grounds.      •You have bloody or black stools (feces) or stools that look like tar.      •You have a severe headache, a stiff neck, or both.      •You have severe pain, cramping, or bloating in your abdomen.      •You have difficulty breathing, or you are breathing very quickly.      •Your heart is beating very quickly.      •Your skin feels cold and clammy.      •You feel confused.    •You have signs of dehydration, such as:  •Dark urine, very little urine, or no urine.      •Cracked lips.      •Dry mouth.      •Sunken eyes.      •Sleepiness.      •Weakness.        These symptoms may be an emergency. Get help right away. Call 911.    • Do not wait to see if the symptoms will go away.       • Do not drive yourself to the hospital.         Summary    •Nausea is the feeling that you have an upset stomach or that you are about to vomit. As nausea gets worse, it can lead to vomiting. Vomiting can make you feel weak and cause you to become dehydrated.      •Follow instructions from your health care provider about eating and drinking to prevent dehydration.      •Take over-the-counter and prescription medicines only as told by your health care provider.      •Contact your health care provider if your symptoms get worse, or you have new symptoms.      •Keep all follow-up visits. This is important.      This information is not intended to replace advice given to you by your health care provider. Make sure you discuss any questions you have with your health care provider.

## 2022-11-29 NOTE — ED ADULT NURSE NOTE - OBJECTIVE STATEMENT
pt states starting 2 days ago she had flu like symptoms with N/V, son tested positive for flu. pt states shes not nauseas but has been  vomiting earlier

## 2022-11-29 NOTE — ED PROVIDER NOTE - ADDITIONAL NOTES AND INSTRUCTIONS:
PT was evaluated At [facility] ED and was found to have a condition that warranted time of to rest and heal from WORK/SCHOOL.   Ubaldo Thurman PA-C

## 2022-11-29 NOTE — ED PROVIDER NOTE - OBJECTIVE STATEMENT
Patient with no significant past medical history presents to the ED for nausea vomiting x6 hours.  Patient states over the last day she has had nonspecific viral-like symptoms with recent significant contact to son who was diagnosed with flu.  Patient states that this evening she had a gradual onset of painless nausea vomiting that has been persistent since onset.  Patient states she been unable to tolerate both solids and liquids.  Patient admits to subjective fevers body aches denies shortness of breath difficulty breathing chest pain headache dizziness shortness of breath.

## 2022-11-29 NOTE — ED PROVIDER NOTE - PATIENT PORTAL LINK FT
You can access the FollowMyHealth Patient Portal offered by [facility] by registering at the following website: [email or web address]. By joining [employer]’s FollowMyHealth portal, you will also be able to view your health information using other applications (apps) compatible with our system.

## 2023-06-11 ENCOUNTER — EMERGENCY (EMERGENCY)
Facility: HOSPITAL | Age: 33
LOS: 1 days | Discharge: HOME | End: 2023-06-11
Attending: STUDENT IN AN ORGANIZED HEALTH CARE EDUCATION/TRAINING PROGRAM
Payer: MEDICAID

## 2023-06-11 VITALS
RESPIRATION RATE: 18 BRPM | HEART RATE: 121 BPM | WEIGHT: 197.98 LBS | SYSTOLIC BLOOD PRESSURE: 138 MMHG | DIASTOLIC BLOOD PRESSURE: 92 MMHG | TEMPERATURE: 98 F | HEIGHT: 67 IN | OXYGEN SATURATION: 98 %

## 2023-06-11 LAB — S PYO DNA THROAT QL NAA+PROBE: DETECTED

## 2023-06-11 PROCEDURE — 99284 EMERGENCY DEPT VISIT MOD MDM: CPT

## 2023-06-11 RX ORDER — DIPHENHYDRAMINE HCL 50 MG
50 CAPSULE ORAL ONCE
Refills: 0 | Status: COMPLETED | OUTPATIENT
Start: 2023-06-11 | End: 2023-06-11

## 2023-06-11 RX ORDER — FAMOTIDINE 10 MG/ML
20 INJECTION INTRAVENOUS ONCE
Refills: 0 | Status: COMPLETED | OUTPATIENT
Start: 2023-06-11 | End: 2023-06-11

## 2023-06-11 RX ORDER — ONDANSETRON 8 MG/1
4 TABLET, FILM COATED ORAL ONCE
Refills: 0 | Status: COMPLETED | OUTPATIENT
Start: 2023-06-11 | End: 2023-06-11

## 2023-06-11 RX ORDER — SODIUM CHLORIDE 9 MG/ML
1000 INJECTION INTRAMUSCULAR; INTRAVENOUS; SUBCUTANEOUS ONCE
Refills: 0 | Status: COMPLETED | OUTPATIENT
Start: 2023-06-11 | End: 2023-06-11

## 2023-06-11 RX ORDER — LIDOCAINE 4 G/100G
10 CREAM TOPICAL ONCE
Refills: 0 | Status: COMPLETED | OUTPATIENT
Start: 2023-06-11 | End: 2023-06-11

## 2023-06-11 RX ADMIN — Medication 50 MILLIGRAM(S): at 22:15

## 2023-06-11 RX ADMIN — FAMOTIDINE 20 MILLIGRAM(S): 10 INJECTION INTRAVENOUS at 22:15

## 2023-06-11 RX ADMIN — SODIUM CHLORIDE 1000 MILLILITER(S): 9 INJECTION INTRAMUSCULAR; INTRAVENOUS; SUBCUTANEOUS at 22:34

## 2023-06-11 RX ADMIN — ONDANSETRON 4 MILLIGRAM(S): 8 TABLET, FILM COATED ORAL at 22:49

## 2023-06-11 NOTE — ED PROVIDER NOTE - PATIENT PORTAL LINK FT
You can access the FollowMyHealth Patient Portal offered by [facility] by registering at the following website: [email or web address]. By joining [employer]’s FollowMyHealth portal, you will also be able to view your health information using other applications (apps) compatible with our system.

## 2023-06-11 NOTE — ED PROVIDER NOTE - NSFOLLOWUPINSTRUCTIONS_ED_ALL_ED_FT
Return to the ER should your symptoms worsen. Follow up with your primary care physician. Follow up with Allergist.     Allergic Reaction    An allergic reaction is an abnormal reaction to a substance (allergen) by the body's defense system. Common allergens include medicines, food, insect bites or stings, and blood products. The body releases certain proteins into the blood that can cause a variety of symptoms such as an itchy rash, wheezing, swelling of the face/lips/tongue/throat, abdominal pain, nausea or vomiting. An allergic reaction is usually treated with medication. If your health care provider prescribed you an epinephrine injection device, make sure to keep it with you at all times.    SEEK IMMEDIATE MEDICAL CARE IF YOU HAVE ANY OF THE FOLLOWING SYMPTOMS: allergic reaction severe enough that required you to use epinephrine, tightness in your chest, swelling around your lips/tongue/throat, abdominal pain, vomiting or diarrhea, or lightheadedness/dizziness. These symptoms may represent a serious problem that is an emergency. Do not wait to see if the symptoms will go away. Use your auto-injector pen or anaphylaxis kit as you have been instructed. Call 911 and do not drive yourself to the hospital. Return to the ER should your symptoms worsen. Follow up with your primary care physician. Follow up with PMD.      Strep Throat, Adult    Strep throat is an infection in the throat that is caused by bacteria. It is common during the cold months of the year. It mostly affects children who are 5–15 years old. However, people of all ages can get it at any time of the year. This infection spreads from person to person (is contagious) through coughing, sneezing, or having close contact.    Your health care provider may use other names to describe the infection. It can be called tonsillitis (if there is swelling of the tonsils), or pharyngitis (if there is swelling at the back of the throat).    What are the causes?  This condition is caused by the Streptococcus pyogenes bacteria.    What increases the risk?  You are more likely to develop this condition if:    You care for school-age children, or are around school-age children. Children are more likely to get strep throat and may spread it to others.  You spend time in crowded places where the infection can spread easily.  You have close contact with someone who has strep throat.    What are the signs or symptoms?  Symptoms of this condition include:    Fever or chills.  Redness, swelling, or pain in the tonsils or throat.  Pain or difficulty when swallowing.  White or yellow spots on the tonsils or throat.  Tender glands in the neck and under the jaw.  Bad smelling breath.  Red rash all over the body. This is rare.    How is this diagnosed?     This condition is diagnosed by tests that check for the presence and the amount of bacteria that cause strep throat. They are:    Rapid strep test. Your throat is swabbed and checked for the presence of bacteria. Results are usually ready in minutes.  Throat culture test. Your throat is swabbed. The sample is placed in a cup that allows infections to grow. Results are usually ready in 1 or 2 days.    How is this treated?  This condition may be treated with:    Medicines that kill germs (antibiotics).  Medicines that relieve pain or fever. These include:    Ibuprofen or acetaminophen.  Aspirin, only for patients who are over the age of 18.  Throat lozenges.  Throat sprays.    Follow these instructions at home:      Medicines     Take over-the-counter and prescription medicines only as told by your health care provider.  Take your antibiotic medicine as told by your health care provider. Do not stop taking the antibiotic even if you start to feel better.        Eating and drinking     If you have trouble swallowing, try eating soft foods until your sore throat feels better.  Drink enough fluid to keep your urine pale yellow.  To help relieve pain, you may have:     Warm fluids, such as soup and tea.  Cold fluids, such as frozen desserts or popsicles.        General instructions    Gargle with a salt-water mixture 3–4 times a day or as needed. To make a salt-water mixture, completely dissolve ½–1 tsp (3–6 g) of salt in 1 cup (237 mL) of warm water.  Get plenty of rest.  Stay home from work or school until you have been taking antibiotics for 24 hours.  Avoid smoking or being around people who smoke.  Keep all follow-up visits as told by your health care provider. This is important.    How is this prevented?     Do not share food, drinking cups, or personal items that could cause the infection to spread to other people.  Wash your hands well with soap and water, and make sure that all people in your house wash their hands well.  Have family members tested if they have a sore throat or fever. They may need an antibiotic if they have strep throat.    Contact a health care provider if:  The glands in your neck continue to get bigger.  You develop a rash, cough, or earache.  You cough up a thick mucus that is green, yellow-brown, or bloody.  You have pain or discomfort that does not get better with medicine.  Your symptoms seem to be getting worse and not better.  You have a fever.    Get help right away if:  You have new symptoms, such as vomiting, severe headache, stiff or painful neck, chest pain, or shortness of breath.  You have severe throat pain, drooling, or changes in your voice.  You have swelling of the neck, or the skin on the neck becomes red and tender.  You have signs of dehydration, such as tiredness (fatigue), dry mouth, and decreased urination.  You become increasingly sleepy, or you cannot wake up completely.  Your joints become red or painful.    Summary  Strep throat is an infection in the throat that is caused by the Streptococcus pyogenes bacteria. This infection is spread from person to person (is contagious) through coughing, sneezing, or having close contact.  Take your medicines, including antibiotics, as told by your health care provider. Do not stop taking the antibiotic even if you start to feel better.  To prevent the spread of germs, wash your hands well with soap and water. Have others do the same. Do not share food, drinking cups, or personal items.  Get help right away if you have new symptoms, such as vomiting, severe headache, stiff or painful neck, chest pain, or shortness of breath.

## 2023-06-11 NOTE — ED ADULT NURSE REASSESSMENT NOTE - NS ED NURSE REASSESS COMMENT FT1
report given to KAUSHIK Katz at 2315. pt moved to B1L. rr even and unlabored. pt reports symptoms have improved. rn made aware of transfer of care. continued cardiac monitoring in place. pt educated on plan of care, pt able to successfully teach back plan of care to RN, RN will continue to reeducate pt during hospital stay.

## 2023-06-11 NOTE — ED ADULT NURSE NOTE - CCCP TRG CHIEF CMPLNT
Acute Care - Physical Therapy Treatment Note   [facility]     Patient Name: Tre Kinney  : 1934  MRN: [id number]  Today's Date: 2017     Date of Referral to PT: 17  Referring Physician: Dr. Barone    Admit Date: 2017    Visit Dx:  No diagnosis found.  Patient Active Problem List   Diagnosis   • Acid reflux   • Ankle arthralgia   • Cardiac conduction disorder   • Closed fracture of distal fibula   • Arteriosclerosis of coronary artery   • Fracture of distal end of tibia   • Elevated cholesterol   • BP (high blood pressure)   • Hypertrophic polyarthritis   • OP (osteoporosis)   • Right heart failure   • Heart failure, chronic, right-sided               Adult Rehabilitation Note       17 1000          Rehab Assessment/Intervention    Discipline physical therapist  -AD      Document Type therapy note (daily note)  -AD      Subjective Information agree to therapy  -AD      Patient Effort, Rehab Treatment good  -AD      Precautions/Limitations fall precautions;oxygen therapy device and L/min   3L  -AD      Patient Response to Treatment Pt tolerated treatment session well with rest breaks provided as needed.  -AD      Recorded by [AD] Ashley Claudene Dalton, PT      Pain Assessment    Pain Assessment No/denies pain  -AD      Recorded by [AD] Ashley Claudene Dalton, PT      Cognitive Assessment/Intervention    Current Cognitive/Communication Assessment functional  -AD      Orientation Status oriented x 4  -AD      Follows Commands/Answers Questions 100% of the time;able to follow single-step instructions;needs cueing  -AD      Personal Safety decreased awareness, need for assist;decreased awareness, need for safety  -AD      Personal Safety Interventions fall prevention program maintained;gait belt;nonskid shoes/slippers when out of bed  -AD      Recorded by [AD] Ashley Claudene Dalton, PT      Bed Mobility, Assessment/Treatment    Bed Mobility, Assistive Device bed rails  -AD      Bed Mob,  Supine to Sit, [place] minimum assist (75% patient effort)  -AD      Bed Mobility, Safety Issues decreased use of arms for pushing/pulling;decreased use of legs for bridging/pushing  -AD      Bed Mobility, Impairments strength decreased  -AD      Recorded by [AD] Ashley Claudene Dalton, PT      Transfer Assessment/Treatment    Transfers, Sit-Stand [place] minimum assist (75% patient effort)  -AD      Transfers, Stand-Sit [place] minimum assist (75% patient effort)  -AD      Transfers, Sit-Stand-Sit, Assist Device rolling walker  -AD      Toilet Transfer, [place] minimum assist (75% patient effort)   Bedside commode  -AD      Toilet Transfer, Assistive Device rolling walker  -AD      Transfer, Safety Issues balance decreased during turns  -AD      Transfer, Impairments strength decreased;impaired balance  -AD      Recorded by [AD] Ashley Claudene Dalton, PT      Gait Assessment/Treatment    Gait, [place] Level minimum assist (75% patient effort)  -AD      Gait, Assistive Device rolling walker  -AD      Gait, Distance (Feet) 100  -AD      Gait, Gait Pattern Analysis swing-through gait  -AD      Gait, Gait Deviations calvin decreased  -AD      Gait, Safety Issues step length decreased  -AD      Gait, Impairments strength decreased  -AD      Recorded by [AD] Ashley Claudene Dalton, PT      Toileting Assessment/Training    Toileting Assess/Train, Assistive Device bedside commode  -AD      Toileting Assess/Train, Position sitting  -AD      Toileting Assess/Train, Indepen Level minimum assist (75% patient effort)  -AD      Toileting Assess/Train, Impairments strength decreased;impaired balance  -AD      Recorded by [AD] Ashley Claudene Dalton, PT      Therapy Exercises    Bilateral Lower Extremities AROM:;10 reps;sitting  -AD      Recorded by [AD] Ashley Claudene Dalton, PT      Positioning and Restraints    Pre-Treatment Position in bed  -AD      Post Treatment Position wheelchair  -AD      In  Chair sitting;call light within reach;encouraged to call for assist;with nsg   3L O2  -AD      Recorded by [AD] Ashley Claudene Dalton, PT        User Key  (r) = Recorded By, (t) = Taken By, (c) = Cosigned By    Initials Name Effective Dates    AD Ashley Claudene Dalton, PT 02/04/16 -                 IP PT Goals       08/01/17 1453          Bed Mobility PT LTG    Bed Mobility PT LTG, Date Established 08/01/17  -AD      Bed Mobility PT LTG, Time to Achieve by discharge  -AD      Bed Mobility PT LTG, Activity Type supine to sit/sit to supine  -AD      Bed Mobility PT LTG, [place] Level contact guard assist;verbal cues required  -AD      Bed Mobility PT Goal  LTG, Assist Device bed rails  -AD      Transfer Training PT LTG    Transfer Training PT LTG, Date Established 08/01/17  -AD      Transfer Training PT LTG, Time to Achieve by discharge  -AD      Transfer Training PT LTG, Activity Type bed to chair /chair to bed;sit to stand/stand to sit  -AD      Transfer Training PT LTG, [place] Level minimum assist (75% patient effort)  -AD      Transfer Training PT LTG, Assist Device walker, rolling  -AD      Gait Training PT LTG    Gait Training Goal PT LTG, Date Established 08/01/17  -AD      Gait Training Goal PT LTG, Time to Achieve by discharge  -AD      Gait Training Goal PT LTG, [place] Level minimum assist (75% patient effort)  -AD      Gait Training Goal PT LTG, Assist Device walker, rolling  -AD      Gait Training Goal PT LTG, Distance to Achieve 150  -AD        User Key  (r) = Recorded By, (t) = Taken By, (c) = Cosigned By    Initials Name Provider Type    AD Ashley Claudene Dalton, PT Physical Therapist          Physical Therapy Education     Title: PT OT SLP Therapies (Done)     Topic: Physical Therapy (Done)     Point: Mobility training (Done)    Learning Progress Summary    Learner Readiness Method Response Comment Documented by Status   Patient Acceptance E VU  AD 08/02/17 1032 Done    Acceptance E  VU  AD 08/01/17 1452 Done    Acceptance E VU  TONY 08/01/17 1110 Done    Acceptance E VU  SN 07/29/17 2031 Done    Acceptance E VU  KM 07/28/17 2053 Done   Family Acceptance E VU  KM 07/28/17 2053 Done               Point: Home exercise program (Done)    Learning Progress Summary    Learner Readiness Method Response Comment Documented by Status   Patient Acceptance E VU  AD 08/02/17 1032 Done    Acceptance E VU  AD 08/01/17 1452 Done    Acceptance E VU  TONY 08/01/17 1110 Done    Acceptance E VU  SN 07/29/17 2031 Done    Acceptance E VU  KM 07/28/17 2053 Done   Family Acceptance E VU  KM 07/28/17 2053 Done               Point: Body mechanics (Done)    Learning Progress Summary    Learner Readiness Method Response Comment Documented by Status   Patient Acceptance E VU  AD 08/02/17 1032 Done    Acceptance E VU  AD 08/01/17 1452 Done    Acceptance E VU  TONY 08/01/17 1110 Done    Acceptance E VU  SN 07/29/17 2031 Done    Acceptance E VU  KM 07/28/17 2053 Done   Family Acceptance E VU  KM 07/28/17 2053 Done               Point: Precautions (Done)    Learning Progress Summary    Learner Readiness Method Response Comment Documented by Status   Patient Acceptance E VU  AD 08/02/17 1032 Done    Acceptance E VU  AD 08/01/17 1452 Done    Acceptance E VU  TONY 08/01/17 1110 Done    Acceptance E VU  SN 07/29/17 2031 Done    Acceptance E VU  KM 07/28/17 2053 Done   Family Acceptance E VU  KM 07/28/17 2053 Done                      User Key     Initials Effective Dates Name Provider Type Discipline    AD 02/04/16 -  Ashley Claudene Dalton, PT Physical Therapist PT     06/16/16 -  Ricky Savage, RN Registered Nurse Nurse     06/16/16 -  Genoveva Garcia, RN Registered Nurse Nurse     11/08/16 -  Chetna Mujica, RN Registered Nurse Nurse                    PT Recommendation and Plan  Planned Therapy Interventions: balance training, bed mobility training, gait training, home exercise program, neuromuscular re-education,  patient/family education, postural re-education, ROM (Range of Motion), strengthening, stretching, transfer training  PT Frequency: 3-5 times/wk, per priority policy             Outcome Measures       08/02/17 1000 08/01/17 1400       How much help from another person do you currently need...    Turning from your back to your side while in flat bed without using bedrails? 3  -AD 3  -AD     Moving from lying on back to sitting on the side of a flat bed without bedrails? 3  -AD 3  -AD     Moving to and from a bed to a chair (including a wheelchair)? 3  -AD 3  -AD     Standing up from a chair using your arms (e.g., wheelchair, bedside chair)? 3  -AD 3  -AD     Climbing 3-5 steps with a railing? 2  -AD 2  -AD     To walk in hospital room? 3  -AD 3  -AD     AM-PAC 6 Clicks Score 17  -AD 17  -AD     Functional Assessment    Outcome Measure Options AM-PAC 6 Clicks Basic Mobility (PT)  -AD AM-PAC 6 Clicks Basic Mobility (PT)  -AD       User Key  (r) = Recorded By, (t) = Taken By, (c) = Cosigned By    Initials Name Provider Type    AD Ashley Claudene Dalton, PT Physical Therapist           Time Calculation:         PT Charges       08/02/17 1032          Time Calculation    Start Time --   30 minutes  -AD      PT Received On 08/02/17  -AD      PT - Next Appointment 08/03/17  -AD      PT Goal Re-Cert Due Date 08/15/17  -AD        User Key  (r) = Recorded By, (t) = Taken By, (c) = Cosigned By    Initials Name Provider Type    AD Ashley Claudene Dalton, PT Physical Therapist          Therapy Charges for Today     Code Description Service Date Service Provider Modifiers Qty    50677352313 HC PT MOBILITY CURRENT 8/1/2017 Ashley Claudene Dalton, PT GP, CL 1    03699754798 HC PT MOBILITY PROJECTED 8/1/2017 Ashley Claudene Dalton, PT GP, CL 1    98338951763 HC PT THER SUPP EA 15 MIN 8/1/2017 Ashley Claudene Dalton, PT GP 1    71924495342 HC PT EVAL HIGH COMPLEXITY 3 8/1/2017 Ashley Claudene Dalton, PT GP 1    22112404269 HC PT THER  SUPP EA 15 MIN 8/2/2017 Ashley Claudene Dalton, PT GP 1    96914613876 HC GAIT TRAINING EA 15 MIN 8/2/2017 Ashley Claudene Dalton, PT GP 1    17608768536 HC PT THERAPEUTIC ACT EA 15 MIN 8/2/2017 Ashley Claudene Dalton, PT GP 1          PT G-Codes  PT Professional Judgement Used?: Yes  Outcome Measure Options: AM-PAC 6 Clicks Basic Mobility (PT)  Functional Limitation: Mobility: Walking and moving around  Mobility: Walking and Moving Around Current Status (): At least 60 percent but less than 80 percent impaired, limited or restricted  Mobility: Walking and Moving Around Goal Status (): At least 60 percent but less than 80 percent impaired, limited or restricted    Ashley Claudene Dalton, PT  8/2/2017          throat difficulty swallowing

## 2023-06-11 NOTE — ED PROVIDER NOTE - PHYSICAL EXAMINATION
Gen: Well appearing in NAD  Head: NC/AT  HEENT: Lower lip swollen. Uvula midline.   Neck: trachea midline  Resp:  No distress, lungs cta b/l. No stridor on exam.   Cardiac: Heart rrr. No murmur.   Abdomen: Soft, nontender, nondistended.   Ext: no deformities  Neuro:  A&O appears non focal  Skin:  Warm and dry as visualized  Psych:  Normal affect and mood

## 2023-06-11 NOTE — ED ADULT NURSE NOTE - NSFALLUNIVINTERV_ED_ALL_ED
Bed/Stretcher in lowest position, wheels locked, appropriate side rails in place/Call bell, personal items and telephone in reach/Instruct patient to call for assistance before getting out of bed/chair/stretcher/Non-slip footwear applied when patient is off stretcher/[place] to call system/Physically safe environment - no spills, clutter or unnecessary equipment/Purposeful proactive rounding/Room/bathroom lighting operational, light cord in reach

## 2023-06-11 NOTE — ED PROVIDER NOTE - OBJECTIVE STATEMENT
34 yo female no major PMHx presents with throat pain beginning 30minutes pta. Patient states that she was at a [employer] party, ate rice, salmon, other fish. 32 yo female history of GERD presents with throat pain beginning 30minutes pta. Patient states that she was at a [employer] party, ate rice, salmon, other fish. She states that while at the party, she started to experience throat pain, difficulty swallowing. Patient states that she is also experiencing lower lip swelling. Patient states that she is allergic to aspirin only, denies history of anaphylactic reaction. Denies difficulty breathing, rash, sob, n/v/d, abdominal pain. Denies other recent illness at this time. Denies taking medication prior to arrival at the ER.

## 2023-06-11 NOTE — ED ADULT TRIAGE NOTE - CHIEF COMPLAINT QUOTE
pt with hoarse voice c/o unable to swallow  "feeling like throat is closing" after eating  shrimp at a [employer] party denies any allergies

## 2023-06-11 NOTE — ED PROVIDER NOTE - CLINICAL SUMMARY MEDICAL DECISION MAKING FREE TEXT BOX
34 yo female presents with sore throat, lower lip swelling over the past hour. Ate fish at [employer] restaurant. Lower lip swollen on arrival. Airway patent. VSS. Will administer pepcid, steroids, Benadryl. Monitor and reassess.

## 2023-06-11 NOTE — ED ADULT NURSE REASSESSMENT NOTE - NS ED NURSE REASSESS COMMENT FT1
received pt from CATINA Marcus. PT breathing equally and unlabored with sinus tach noted on bedside monitor. PT reports reduction in throat tightness and is resting comfortably on room air with out issue. Bed locked and in lowest postion.

## 2023-06-12 VITALS
TEMPERATURE: 99 F | OXYGEN SATURATION: 98 % | HEART RATE: 106 BPM | DIASTOLIC BLOOD PRESSURE: 86 MMHG | RESPIRATION RATE: 18 BRPM | SYSTOLIC BLOOD PRESSURE: 125 MMHG

## 2023-06-12 LAB
ALBUMIN SERPL ELPH-MCNC: 3.7 G/DL — SIGNIFICANT CHANGE UP (ref 3.3–5.2)
ALP SERPL-CCNC: 104 U/L — SIGNIFICANT CHANGE UP (ref 40–120)
ALT FLD-CCNC: 40 U/L — HIGH
ANION GAP SERPL CALC-SCNC: 13 MMOL/L — SIGNIFICANT CHANGE UP (ref 5–17)
AST SERPL-CCNC: 22 U/L — SIGNIFICANT CHANGE UP
BASOPHILS # BLD AUTO: 0.03 K/UL — SIGNIFICANT CHANGE UP (ref 0–0.2)
BASOPHILS NFR BLD AUTO: 0.2 % — SIGNIFICANT CHANGE UP (ref 0–2)
BILIRUB SERPL-MCNC: <0.2 MG/DL — LOW (ref 0.4–2)
BUN SERPL-MCNC: 11.6 MG/DL — SIGNIFICANT CHANGE UP (ref 8–20)
CALCIUM SERPL-MCNC: 8.7 MG/DL — SIGNIFICANT CHANGE UP (ref 8.4–10.5)
CHLORIDE SERPL-SCNC: 106 MMOL/L — SIGNIFICANT CHANGE UP (ref 96–108)
CO2 SERPL-SCNC: 21 MMOL/L — LOW (ref 22–29)
CREAT SERPL-MCNC: 0.57 MG/DL — SIGNIFICANT CHANGE UP (ref 0.5–1.3)
EGFR: 121 ML/MIN/1.73M2 — SIGNIFICANT CHANGE UP
EOSINOPHIL # BLD AUTO: 0.03 K/UL — SIGNIFICANT CHANGE UP (ref 0–0.5)
EOSINOPHIL NFR BLD AUTO: 0.2 % — SIGNIFICANT CHANGE UP (ref 0–6)
GLUCOSE SERPL-MCNC: 119 MG/DL — HIGH (ref 70–99)
HCT VFR BLD CALC: 37.3 % — SIGNIFICANT CHANGE UP (ref 34.5–45)
HETEROPH AB TITR SER AGGL: NEGATIVE — SIGNIFICANT CHANGE UP
HGB BLD-MCNC: 12.3 G/DL — SIGNIFICANT CHANGE UP (ref 11.5–15.5)
IMM GRANULOCYTES NFR BLD AUTO: 0.8 % — SIGNIFICANT CHANGE UP (ref 0–0.9)
LYMPHOCYTES # BLD AUTO: 1.05 K/UL — SIGNIFICANT CHANGE UP (ref 1–3.3)
LYMPHOCYTES # BLD AUTO: 6.6 % — LOW (ref 13–44)
MCHC RBC-ENTMCNC: 26.6 PG — LOW (ref 27–34)
MCHC RBC-ENTMCNC: 33 GM/DL — SIGNIFICANT CHANGE UP (ref 32–36)
MCV RBC AUTO: 80.6 FL — SIGNIFICANT CHANGE UP (ref 80–100)
MONOCYTES # BLD AUTO: 0.46 K/UL — SIGNIFICANT CHANGE UP (ref 0–0.9)
MONOCYTES NFR BLD AUTO: 2.9 % — SIGNIFICANT CHANGE UP (ref 2–14)
NEUTROPHILS # BLD AUTO: 14.22 K/UL — HIGH (ref 1.8–7.4)
NEUTROPHILS NFR BLD AUTO: 89.3 % — HIGH (ref 43–77)
PLATELET # BLD AUTO: 295 K/UL — SIGNIFICANT CHANGE UP (ref 150–400)
POTASSIUM SERPL-MCNC: 4.3 MMOL/L — SIGNIFICANT CHANGE UP (ref 3.5–5.3)
POTASSIUM SERPL-SCNC: 4.3 MMOL/L — SIGNIFICANT CHANGE UP (ref 3.5–5.3)
PROT SERPL-MCNC: 7.1 G/DL — SIGNIFICANT CHANGE UP (ref 6.6–8.7)
RBC # BLD: 4.63 M/UL — SIGNIFICANT CHANGE UP (ref 3.8–5.2)
RBC # FLD: 14 % — SIGNIFICANT CHANGE UP (ref 10.3–14.5)
SODIUM SERPL-SCNC: 139 MMOL/L — SIGNIFICANT CHANGE UP (ref 135–145)
WBC # BLD: 15.92 K/UL — HIGH (ref 3.8–10.5)
WBC # FLD AUTO: 15.92 K/UL — HIGH (ref 3.8–10.5)

## 2023-06-12 PROCEDURE — 87651 STREP A DNA AMP PROBE: CPT

## 2023-06-12 PROCEDURE — 36415 COLL VENOUS BLD VENIPUNCTURE: CPT

## 2023-06-12 PROCEDURE — 87798 DETECT AGENT NOS DNA AMP: CPT

## 2023-06-12 PROCEDURE — 99284 EMERGENCY DEPT VISIT MOD MDM: CPT | Mod: 25

## 2023-06-12 PROCEDURE — 96374 THER/PROPH/DIAG INJ IV PUSH: CPT

## 2023-06-12 PROCEDURE — 96375 TX/PRO/DX INJ NEW DRUG ADDON: CPT

## 2023-06-12 PROCEDURE — 86308 HETEROPHILE ANTIBODY SCREEN: CPT

## 2023-06-12 PROCEDURE — 85025 COMPLETE CBC W/AUTO DIFF WBC: CPT

## 2023-06-12 PROCEDURE — 80053 COMPREHEN METABOLIC PANEL: CPT

## 2023-06-12 RX ORDER — PENICILLIN V POTASIUM 500 MG/1
1 TABLET OROPHARYNGEAL
Qty: 30 | Refills: 0
Start: 2023-06-12 | End: 2023-06-21

## 2023-06-12 RX ORDER — PENICILLIN V POTASIUM 500 MG/1
500 TABLET OROPHARYNGEAL ONCE
Refills: 0 | Status: COMPLETED | OUTPATIENT
Start: 2023-06-12 | End: 2023-06-12

## 2023-06-12 RX ADMIN — PENICILLIN V POTASIUM 500 MILLIGRAM(S): 500 TABLET OROPHARYNGEAL at 03:09

## 2023-06-12 NOTE — ED POST DISCHARGE NOTE - REASON FOR FOLLOW-UP
standing cartside, very attentive. Concerned over pt pain level. meds given as ordered. IVF infusing. Pt also given a hot pack and warm blanket. Pt called due to prescription not being sent. Other

## 2023-06-13 RX ORDER — PENICILLIN V POTASIUM 500 MG/1
1 TABLET OROPHARYNGEAL
Qty: 20 | Refills: 0
Start: 2023-06-13 | End: 2023-06-22

## 2023-08-18 ENCOUNTER — OFFICE (OUTPATIENT)
Dept: URBAN - METROPOLITAN AREA CLINIC 12 | Facility: CLINIC | Age: 33
Setting detail: OPHTHALMOLOGY
End: 2023-08-18

## 2023-08-18 DIAGNOSIS — H52.13: ICD-10-CM

## 2023-08-18 PROCEDURE — SCREF LASIK EVAL: Performed by: OPHTHALMOLOGY

## 2023-08-18 ASSESSMENT — REFRACTION_MANIFEST
OS_VA1: 20/20
OS_CYLINDER: -1.00
OD_VA1: 20/20-1
OD_AXIS: 175
OD_SPHERE: -1.25
OD_CYLINDER: -0.50
OS_SPHERE: -1.00
OS_AXIS: 050

## 2023-08-18 ASSESSMENT — KERATOMETRY
OD_K1POWER_DIOPTERS: 43.75
OS_AXISANGLE_DEGREES: 134
OS_K1POWER_DIOPTERS: 43.50
OS_K2POWER_DIOPTERS: 44.50
OD_K2POWER_DIOPTERS: 44.75
OD_AXISANGLE_DEGREES: 061

## 2023-08-18 ASSESSMENT — REFRACTION_CURRENTRX
OS_CYLINDER: -1.00
OD_CYLINDER: -0.50
OD_VPRISM_DIRECTION: SV
OS_VPRISM_DIRECTION: SV
OS_OVR_VA: 20/
OD_OVR_VA: 20/
OD_SPHERE: -1.25
OD_AXIS: 001
OS_SPHERE: -1.00
OS_AXIS: 052

## 2023-08-18 ASSESSMENT — TONOMETRY
OS_IOP_MMHG: 11
OD_IOP_MMHG: 12

## 2023-08-18 ASSESSMENT — REFRACTION_AUTOREFRACTION
OS_AXIS: 049
OD_SPHERE: -1.25
OD_AXIS: 173
OS_CYLINDER: -1.00
OD_CYLINDER: -0.50
OS_SPHERE: -1.00

## 2023-08-18 ASSESSMENT — CONFRONTATIONAL VISUAL FIELD TEST (CVF)
OD_FINDINGS: FULL
OS_FINDINGS: FULL

## 2023-08-18 ASSESSMENT — AXIALLENGTH_DERIVED
OD_AL: 23.9049
OS_AL: 23.9996
OD_AL: 23.9049
OS_AL: 23.9996

## 2023-08-18 ASSESSMENT — SPHEQUIV_DERIVED
OS_SPHEQUIV: -1.5
OS_SPHEQUIV: -1.5
OD_SPHEQUIV: -1.5
OD_SPHEQUIV: -1.5

## 2023-08-18 ASSESSMENT — VISUAL ACUITY
OS_BCVA: 20/20
OD_BCVA: 20/20-2

## 2023-09-15 ENCOUNTER — OFFICE (OUTPATIENT)
Dept: URBAN - METROPOLITAN AREA CLINIC 12 | Facility: CLINIC | Age: 33
Setting detail: OPHTHALMOLOGY
End: 2023-09-15

## 2023-09-15 DIAGNOSIS — H52.13: ICD-10-CM

## 2023-09-15 PROCEDURE — SCREF LASIK EVAL: Performed by: OPHTHALMOLOGY

## 2023-09-15 ASSESSMENT — REFRACTION_MANIFEST
OD_AXIS: 165
OS_CYLINDER: -1.00
OS_VA1: 20/20-2
OD_SPHERE: -1.12
OD_CYLINDER: -0.50
OS_SPHERE: -1.00
OD_VA1: 20/20-2
OS_AXIS: 045

## 2023-09-15 ASSESSMENT — AXIALLENGTH_DERIVED
OS_AL: 23.9049
OD_AL: 23.81
OS_AL: 23.9049
OD_AL: 23.8579

## 2023-09-15 ASSESSMENT — REFRACTION_AUTOREFRACTION
OD_CYLINDER: -0.50
OD_SPHERE: -1.25
OS_CYLINDER: -1.00
OS_SPHERE: -1.00
OS_AXIS: 048
OD_AXIS: 174

## 2023-09-15 ASSESSMENT — SPHEQUIV_DERIVED
OD_SPHEQUIV: -1.37
OS_SPHEQUIV: -1.5
OD_SPHEQUIV: -1.5
OS_SPHEQUIV: -1.5

## 2023-09-15 ASSESSMENT — REFRACTION_CURRENTRX
OS_AXIS: 046
OS_OVR_VA: 20/
OD_SPHERE: -1.25
OD_OVR_VA: 20/
OS_VPRISM_DIRECTION: SV
OD_VPRISM_DIRECTION: SV
OD_AXIS: 178
OD_CYLINDER: -0.50
OS_SPHERE: -1.00
OS_CYLINDER: 0.00

## 2023-09-15 ASSESSMENT — KERATOMETRY
OD_K2POWER_DIOPTERS: 44.75
OD_K1POWER_DIOPTERS: 44.00
OS_K2POWER_DIOPTERS: 44.75
OD_AXISANGLE_DEGREES: 069
OS_AXISANGLE_DEGREES: 138
OS_K1POWER_DIOPTERS: 43.75

## 2023-09-15 ASSESSMENT — TONOMETRY
OS_IOP_MMHG: 14
OD_IOP_MMHG: 13

## 2023-09-15 ASSESSMENT — CONFRONTATIONAL VISUAL FIELD TEST (CVF)
OD_FINDINGS: FULL
OS_FINDINGS: FULL

## 2023-09-15 ASSESSMENT — VISUAL ACUITY
OS_BCVA: 20/20
OD_BCVA: 20/20

## 2023-09-20 ENCOUNTER — RX ONLY (RX ONLY)
Age: 33
End: 2023-09-20

## 2023-09-20 ENCOUNTER — OTHER LOCATION (OUTPATIENT)
Dept: URBAN - METROPOLITAN AREA LASIK CENTER 6 | Facility: LASIK CENTER | Age: 33
Setting detail: OPHTHALMOLOGY
End: 2023-09-20

## 2023-09-20 DIAGNOSIS — H52.13: ICD-10-CM

## 2023-09-20 PROCEDURE — 65760 KERATOMILEUSIS: CPT | Performed by: OPHTHALMOLOGY

## 2023-09-20 ASSESSMENT — REFRACTION_CURRENTRX
OD_CYLINDER: -0.50
OS_VPRISM_DIRECTION: SV
OD_OVR_VA: 20/
OD_VPRISM_DIRECTION: SV
OD_AXIS: 178
OS_AXIS: 046
OS_CYLINDER: 0.00
OD_SPHERE: -1.25
OS_SPHERE: -1.00
OS_OVR_VA: 20/

## 2023-09-20 ASSESSMENT — REFRACTION_AUTOREFRACTION
OD_SPHERE: -1.25
OS_CYLINDER: -1.00
OD_AXIS: 174
OS_AXIS: 048
OD_CYLINDER: -0.50
OS_SPHERE: -1.00

## 2023-09-20 ASSESSMENT — AXIALLENGTH_DERIVED
OD_AL: 23.81
OD_AL: 23.8579
OS_AL: 23.9049
OS_AL: 23.9049

## 2023-09-20 ASSESSMENT — KERATOMETRY
OD_K1POWER_DIOPTERS: 44.00
OS_AXISANGLE_DEGREES: 138
OS_K1POWER_DIOPTERS: 43.75
OS_K2POWER_DIOPTERS: 44.75
OD_K2POWER_DIOPTERS: 44.75
OD_AXISANGLE_DEGREES: 069

## 2023-09-20 ASSESSMENT — REFRACTION_MANIFEST
OD_SPHERE: -1.12
OS_CYLINDER: -1.00
OS_VA1: 20/20-2
OD_VA1: 20/20-2
OD_AXIS: 165
OS_SPHERE: -1.00
OS_AXIS: 045
OD_CYLINDER: -0.50

## 2023-09-20 ASSESSMENT — VISUAL ACUITY
OS_BCVA: 20/20
OD_BCVA: 20/20

## 2023-09-20 ASSESSMENT — SPHEQUIV_DERIVED
OS_SPHEQUIV: -1.5
OS_SPHEQUIV: -1.5
OD_SPHEQUIV: -1.5
OD_SPHEQUIV: -1.37

## 2023-09-21 ENCOUNTER — OFFICE (OUTPATIENT)
Dept: URBAN - METROPOLITAN AREA CLINIC 12 | Facility: CLINIC | Age: 33
Setting detail: OPHTHALMOLOGY
End: 2023-09-21

## 2023-09-21 DIAGNOSIS — H52.13: ICD-10-CM

## 2023-09-21 PROCEDURE — 99024 POSTOP FOLLOW-UP VISIT: CPT | Performed by: OPHTHALMOLOGY

## 2023-09-21 ASSESSMENT — REFRACTION_CURRENTRX
OD_VPRISM_DIRECTION: SV
OS_AXIS: 046
OS_OVR_VA: 20/
OD_AXIS: 178
OD_CYLINDER: -0.50
OS_VPRISM_DIRECTION: SV
OD_OVR_VA: 20/
OS_SPHERE: -1.00
OD_SPHERE: -1.25
OS_CYLINDER: 0.00

## 2023-09-21 ASSESSMENT — VISUAL ACUITY
OS_BCVA: 20/20
OD_BCVA: 20/20

## 2023-09-21 ASSESSMENT — REFRACTION_MANIFEST
OD_VA1: 20/20-2
OD_SPHERE: -1.12
OS_CYLINDER: -1.00
OD_AXIS: 165
OS_SPHERE: -1.00
OD_CYLINDER: -0.50
OS_AXIS: 045
OS_VA1: 20/20-2

## 2023-09-21 ASSESSMENT — SPHEQUIV_DERIVED
OS_SPHEQUIV: 0.625
OD_SPHEQUIV: -1.37
OD_SPHEQUIV: 0.25
OS_SPHEQUIV: -1.5

## 2023-09-21 ASSESSMENT — REFRACTION_AUTOREFRACTION
OD_SPHERE: +0.50
OD_CYLINDER: -0.50
OS_AXIS: 144
OS_CYLINDER: -0.25
OD_AXIS: 16
OS_SPHERE: +0.75

## 2023-09-21 ASSESSMENT — KERATOMETRY
OD_K2POWER_DIOPTERS: 42.00
OD_AXISANGLE_DEGREES: 64
OS_AXISANGLE_DEGREES: 108
OS_K1POWER_DIOPTERS: 41.75
OD_K1POWER_DIOPTERS: 41.75
OS_K2POWER_DIOPTERS: 42.00

## 2023-09-21 ASSESSMENT — AXIALLENGTH_DERIVED
OS_AL: 23.9507
OS_AL: 24.8355
OD_AL: 24.1022
OD_AL: 24.78

## 2023-09-21 ASSESSMENT — CONFRONTATIONAL VISUAL FIELD TEST (CVF)
OS_FINDINGS: FULL
OD_FINDINGS: FULL

## 2023-09-28 ENCOUNTER — OFFICE (OUTPATIENT)
Dept: URBAN - METROPOLITAN AREA CLINIC 12 | Facility: CLINIC | Age: 33
Setting detail: OPHTHALMOLOGY
End: 2023-09-28

## 2023-09-28 DIAGNOSIS — H52.13: ICD-10-CM

## 2023-09-28 PROCEDURE — 99024 POSTOP FOLLOW-UP VISIT: CPT | Performed by: OPHTHALMOLOGY

## 2023-09-28 ASSESSMENT — REFRACTION_AUTOREFRACTION
OD_AXIS: 062
OS_SPHERE: +1.00
OS_AXIS: 148
OS_CYLINDER: -0.25
OD_CYLINDER: -0.25
OD_SPHERE: +0.50

## 2023-09-28 ASSESSMENT — REFRACTION_MANIFEST
OD_VA1: 20/20-2
OD_SPHERE: -1.12
OS_SPHERE: -1.00
OS_AXIS: 045
OD_AXIS: 165
OS_CYLINDER: -1.00
OD_CYLINDER: -0.50
OS_VA1: 20/20-2

## 2023-09-28 ASSESSMENT — AXIALLENGTH_DERIVED
OS_AL: 23.9449
OD_AL: 24.78
OD_AL: 24.0515
OS_AL: 24.9376

## 2023-09-28 ASSESSMENT — REFRACTION_CURRENTRX
OS_SPHERE: -1.00
OD_SPHERE: -1.25
OD_CYLINDER: -0.50
OD_VPRISM_DIRECTION: SV
OS_VPRISM_DIRECTION: SV
OS_AXIS: 046
OD_AXIS: 178
OS_OVR_VA: 20/
OD_OVR_VA: 20/
OS_CYLINDER: 0.00

## 2023-09-28 ASSESSMENT — SPHEQUIV_DERIVED
OD_SPHEQUIV: 0.375
OS_SPHEQUIV: -1.5
OD_SPHEQUIV: -1.37
OS_SPHEQUIV: 0.875

## 2023-09-28 ASSESSMENT — TONOMETRY
OD_IOP_MMHG: 11
OS_IOP_MMHG: 12

## 2023-09-28 ASSESSMENT — KERATOMETRY
OS_AXISANGLE_DEGREES: 104
OS_K2POWER_DIOPTERS: 41.75
OS_K1POWER_DIOPTERS: 41.50
OD_AXISANGLE_DEGREES: 031
OD_K1POWER_DIOPTERS: 41.75
OD_K2POWER_DIOPTERS: 42.00

## 2023-09-28 ASSESSMENT — CONFRONTATIONAL VISUAL FIELD TEST (CVF)
OD_FINDINGS: FULL
OS_FINDINGS: FULL

## 2023-09-28 ASSESSMENT — VISUAL ACUITY
OS_BCVA: 20/20
OD_BCVA: 20/20-1

## 2023-10-30 ENCOUNTER — OFFICE (OUTPATIENT)
Dept: URBAN - METROPOLITAN AREA CLINIC 12 | Facility: CLINIC | Age: 33
Setting detail: OPHTHALMOLOGY
End: 2023-10-30

## 2023-10-30 DIAGNOSIS — H52.13: ICD-10-CM

## 2023-10-30 PROCEDURE — 99024 POSTOP FOLLOW-UP VISIT: CPT | Performed by: OPTOMETRIST

## 2023-10-30 ASSESSMENT — REFRACTION_CURRENTRX
OS_AXIS: 046
OD_OVR_VA: 20/
OS_OVR_VA: 20/
OS_SPHERE: -1.00
OD_AXIS: 178
OD_SPHERE: -1.25
OS_VPRISM_DIRECTION: SV
OD_VPRISM_DIRECTION: SV
OS_CYLINDER: 0.00
OD_CYLINDER: -0.50

## 2023-10-30 ASSESSMENT — KERATOMETRY
OS_K2POWER_DIOPTERS: 42.00
OD_K2POWER_DIOPTERS: 42.25
OD_AXISANGLE_DEGREES: 53
OS_K1POWER_DIOPTERS: 41.75
OD_K1POWER_DIOPTERS: 42.00
OS_AXISANGLE_DEGREES: 109

## 2023-10-30 ASSESSMENT — REFRACTION_MANIFEST
OS_AXIS: 045
OS_SPHERE: -1.00
OS_CYLINDER: -1.00
OD_CYLINDER: -0.50
OS_VA1: 20/20-2
OD_SPHERE: -1.12
OD_VA1: 20/20-2
OD_AXIS: 165

## 2023-10-30 ASSESSMENT — AXIALLENGTH_DERIVED
OS_AL: 24.8355
OD_AL: 24.0573
OS_AL: 24.0009
OD_AL: 24.68

## 2023-10-30 ASSESSMENT — SPHEQUIV_DERIVED
OS_SPHEQUIV: -1.5
OS_SPHEQUIV: 0.5
OD_SPHEQUIV: 0.125
OD_SPHEQUIV: -1.37

## 2023-10-30 ASSESSMENT — REFRACTION_AUTOREFRACTION
OD_AXIS: 26
OD_CYLINDER: -0.25
OD_SPHERE: +0.25
OS_AXIS: 175
OS_CYLINDER: -0.50
OS_SPHERE: +0.75

## 2023-10-30 ASSESSMENT — VISUAL ACUITY
OD_BCVA: 20/20
OS_BCVA: 20/20

## 2023-10-30 ASSESSMENT — CONFRONTATIONAL VISUAL FIELD TEST (CVF)
OS_FINDINGS: FULL
OD_FINDINGS: FULL

## 2023-10-30 ASSESSMENT — TONOMETRY
OD_IOP_MMHG: 13
OS_IOP_MMHG: 10

## 2024-01-30 ENCOUNTER — OFFICE (OUTPATIENT)
Dept: URBAN - METROPOLITAN AREA CLINIC 12 | Facility: CLINIC | Age: 34
Setting detail: OPHTHALMOLOGY
End: 2024-01-30

## 2024-01-30 ENCOUNTER — RX ONLY (RX ONLY)
Age: 34
End: 2024-01-30

## 2024-01-30 DIAGNOSIS — H52.13: ICD-10-CM

## 2024-01-30 PROCEDURE — 99024 POSTOP FOLLOW-UP VISIT: CPT | Performed by: OPTOMETRIST

## 2024-01-30 ASSESSMENT — REFRACTION_CURRENTRX
OS_SPHERE: -1.00
OS_CYLINDER: 0.00
OD_CYLINDER: -0.50
OS_VPRISM_DIRECTION: SV
OD_VPRISM_DIRECTION: SV
OD_SPHERE: -1.25
OS_AXIS: 046
OS_OVR_VA: 20/
OD_AXIS: 178
OD_OVR_VA: 20/

## 2024-01-30 ASSESSMENT — CONFRONTATIONAL VISUAL FIELD TEST (CVF)
OD_FINDINGS: FULL
OS_FINDINGS: FULL

## 2024-01-30 ASSESSMENT — REFRACTION_AUTOREFRACTION
OD_SPHERE: PLANO
OS_SPHERE: +0.25
OS_AXIS: 173
OD_AXIS: 005
OD_CYLINDER: -0.25
OS_CYLINDER: -0.25

## 2024-01-30 ASSESSMENT — REFRACTION_MANIFEST
OS_AXIS: 045
OS_SPHERE: -1.00
OD_VA1: 20/20-2
OD_CYLINDER: -0.50
OS_VA1: 20/20-2
OD_AXIS: 165
OD_SPHERE: -1.12
OS_CYLINDER: -1.00

## 2024-01-30 ASSESSMENT — SPHEQUIV_DERIVED
OS_SPHEQUIV: -1.5
OS_SPHEQUIV: 0.125
OD_SPHEQUIV: -1.37

## 2024-03-08 NOTE — ED ADULT TRIAGE NOTE - NS ED NURSE BANDS TYPE
Name band; Pt recd a&ox1, cooperative, 2L NC in place, SpO2 @ 93%, NAD, 0/10 pain scale pre/post, EVD clamped by RN prior to sitting up, daughter in law @ the bedside declining Cantonese interpretation and providing as needed.

## 2024-07-30 ENCOUNTER — OFFICE (OUTPATIENT)
Dept: URBAN - METROPOLITAN AREA CLINIC 12 | Facility: CLINIC | Age: 34
Setting detail: OPHTHALMOLOGY
End: 2024-07-30

## 2024-07-30 DIAGNOSIS — H52.13: ICD-10-CM

## 2024-07-30 PROCEDURE — 99024 POSTOP FOLLOW-UP VISIT: CPT | Performed by: OPTOMETRIST

## 2024-07-30 ASSESSMENT — CONFRONTATIONAL VISUAL FIELD TEST (CVF)
OS_FINDINGS: FULL
OD_FINDINGS: FULL

## 2025-07-30 ENCOUNTER — OFFICE (OUTPATIENT)
Dept: URBAN - METROPOLITAN AREA CLINIC 12 | Facility: CLINIC | Age: 35
Setting detail: OPHTHALMOLOGY
End: 2025-07-30
Payer: COMMERCIAL

## 2025-07-30 DIAGNOSIS — H02.822: ICD-10-CM

## 2025-07-30 PROCEDURE — 92012 INTRM OPH EXAM EST PATIENT: CPT | Performed by: OPTOMETRIST

## 2025-07-30 ASSESSMENT — REFRACTION_MANIFEST
OS_SPHERE: -1.00
OS_CYLINDER: -1.00
OD_CYLINDER: -0.50
OD_VA1: 20/20-2
OD_SPHERE: -1.12
OS_VA1: 20/20-2
OD_AXIS: 165
OS_AXIS: 045

## 2025-07-30 ASSESSMENT — REFRACTION_CURRENTRX
OD_SPHERE: -1.25
OS_OVR_VA: 20/
OS_CYLINDER: 0.00
OD_OVR_VA: 20/
OS_VPRISM_DIRECTION: SV
OD_AXIS: 178
OS_AXIS: 046
OD_CYLINDER: -0.50
OS_SPHERE: -1.00
OD_VPRISM_DIRECTION: SV

## 2025-07-30 ASSESSMENT — KERATOMETRY
OD_K2POWER_DIOPTERS: 42.50
OS_AXISANGLE_DEGREES: 105
OS_K1POWER_DIOPTERS: 42.00
OD_AXISANGLE_DEGREES: 066
OS_K2POWER_DIOPTERS: 42.25
OD_K1POWER_DIOPTERS: 42.00

## 2025-07-30 ASSESSMENT — REFRACTION_AUTOREFRACTION
OS_SPHERE: PLANO
OD_SPHERE: -0.25
OS_CYLINDER: -0.25
OD_AXIS: 162
OS_AXIS: 160
OD_CYLINDER: -0.25

## 2025-07-30 ASSESSMENT — VISUAL ACUITY
OD_BCVA: 20/20
OS_BCVA: 20/20